# Patient Record
Sex: FEMALE | Race: WHITE | Employment: FULL TIME | URBAN - METROPOLITAN AREA
[De-identification: names, ages, dates, MRNs, and addresses within clinical notes are randomized per-mention and may not be internally consistent; named-entity substitution may affect disease eponyms.]

---

## 2023-03-06 ENCOUNTER — OFFICE VISIT (OUTPATIENT)
Dept: OCCUPATIONAL MEDICINE | Age: 23
End: 2023-03-06

## 2023-03-06 VITALS
TEMPERATURE: 96.8 F | RESPIRATION RATE: 16 BRPM | DIASTOLIC BLOOD PRESSURE: 73 MMHG | WEIGHT: 140 LBS | SYSTOLIC BLOOD PRESSURE: 113 MMHG | BODY MASS INDEX: 22.5 KG/M2 | OXYGEN SATURATION: 97 % | HEIGHT: 66 IN | HEART RATE: 97 BPM

## 2023-03-06 DIAGNOSIS — R05.1 ACUTE COUGH: ICD-10-CM

## 2023-03-06 DIAGNOSIS — R09.81 STUFFY NOSE: Primary | ICD-10-CM

## 2023-03-06 LAB — SARS-COV-2, POC: NORMAL

## 2023-03-06 ASSESSMENT — ENCOUNTER SYMPTOMS
VOMITING: 0
SINUS PAIN: 0
NAUSEA: 0
TROUBLE SWALLOWING: 0
SINUS PRESSURE: 1
RHINORRHEA: 0
VOICE CHANGE: 0
COUGH: 1
ABDOMINAL PAIN: 0
SHORTNESS OF BREATH: 0
SORE THROAT: 0

## 2023-03-06 NOTE — PROGRESS NOTES
[facility]  [address]  [phone]    SUBJECTIVE:     Moises Alvarado is a 23 y.o. 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 She has used sudafed x 1 day and nose spray OTC (unsure type of spray). No current outpatient medications on file. No Known Allergies    Review of Systems   Constitutional:  Negative for chills and fever. HENT:  Positive for congestion and sinus pressure. Negative for ear pain, rhinorrhea, sinus pain, sore throat, trouble swallowing and voice change. Respiratory:  Positive for cough. Negative for shortness of breath. Cardiovascular:  Negative for chest pain. Gastrointestinal:  Negative for abdominal pain, nausea and vomiting. OBJECTIVE:    /73 (Site: Right Upper Arm, Position: Sitting)   Pulse 97   Temp 96.8 °F (36 °C) (Temporal)   Resp 16   Ht 5' 6\" (1.676 m)   Wt 140 lb (63.5 kg)   LMP 02/27/2023 (Exact Date)   SpO2 97%   BMI 22.60 kg/m²      Physical Exam  Vitals reviewed. Constitutional:       Appearance: Normal appearance. HENT:      Head: Normocephalic and atraumatic. Right Ear: Hearing, tympanic membrane and ear canal normal.      Left Ear: Hearing, tympanic membrane and ear canal normal.      Nose: Congestion present. Mouth/Throat:      Lips: Pink. Mouth: Mucous membranes are moist.      Pharynx: Uvula midline. No oropharyngeal exudate or posterior oropharyngeal erythema. Comments: Mild pnd  Cardiovascular:      Rate and Rhythm: Normal rate and regular rhythm. Pulmonary:      Effort: Pulmonary effort is normal.      Breath sounds: Normal breath sounds. Neurological:      Mental Status: She is alert and oriented to person, place, and time.    Psychiatric:         Mood and Affect: Mood normal.         Behavior: Behavior normal.        Results for orders placed or performed in visit on 03/06/23   AMB POC SARS-COV-2 Result Value Ref Range    SARS-COV-2, POC Not-Detected Not Detected          ASSESSMENT and PLAN         Diagnosis Orders   1. Stuffy nose  AMB POC SARS-COV-2      2. Acute cough  AMB POC SARS-COV-2            Recommendations: Discussed viral vs bacterial upper respiratory infections. Suggested OTC treatments to help alleviate symptoms. Adequate oral hydration. Use CDC precautions while sick. Monitor s/s and return to clinic or seek further evaluation in another clinical setting if s/s persist or new or worsening s/s for re-evaluation and/or re-testing. Follow up: Patient was encouraged to return to the clinic and/or PCP if s/s persist or do not resolve completely. Also advised to seek emergent care if new or worsening signs and symptoms which warrant immediate evaluation including, but not limited to: headache, vision changes, speech disturbance, difficulty with ambulation/gait, numbness, tingling, weakness, fever (100.4 or higher), syncope, abdominal pain, chest pain, or shortness of breath. *Counseling provided on benefits of having a primary care provider which includes, but is not limited to, continuity of care and having a medical home when routine and emergent health needs arise. Also reminded patient that onsite clinic policy states that we are not to take the place of a primary care provider. Patient verbalized understanding. *Side effects, adverse effects, risks versus benefits associated with medications prescribed/recommended were discussed with the patient. Patient verbalized understanding and agrees with treatment plan. All questions answered.     * I have reviewed the patient's medication list, past medical, family, social, and surgical history and updated the patient record appropriately        Social History     Socioeconomic History    Marital status: Not on file     Spouse name: Not on file    Number of children: Not on file    Years of education: Not on file    Highest education level: Not on file   Occupational History    Not on file   Tobacco Use    Smoking status: Never    Smokeless tobacco: Never   Substance and Sexual Activity    Alcohol use: Never    Drug use: Never    Sexual activity: Never   Other Topics Concern    Not on file   Social History Narrative    Not on file     Social Determinants of Health     Financial Resource Strain: Not on file   Food Insecurity: Not on file   Transportation Needs: Not on file   Physical Activity: Not on file   Stress: Not on file   Social Connections: Not on file   Intimate Partner Violence: Not on file   Housing Stability: Not on file     History reviewed. No pertinent past medical history. No past surgical history on file. Family History   Problem Relation Age of Onset    No Known Problems Mother     No Known Problems Father      There are no Patient Instructions on file for this visit.